# Patient Record
Sex: FEMALE | Race: WHITE | Employment: OTHER | URBAN - METROPOLITAN AREA
[De-identification: names, ages, dates, MRNs, and addresses within clinical notes are randomized per-mention and may not be internally consistent; named-entity substitution may affect disease eponyms.]

---

## 2020-11-12 ENCOUNTER — OFFICE VISIT (OUTPATIENT)
Dept: NEUROLOGY | Age: 70
End: 2020-11-12
Payer: MEDICARE

## 2020-11-12 VITALS
OXYGEN SATURATION: 98 % | RESPIRATION RATE: 18 BRPM | BODY MASS INDEX: 31.71 KG/M2 | HEART RATE: 72 BPM | HEIGHT: 67 IN | WEIGHT: 202 LBS | SYSTOLIC BLOOD PRESSURE: 118 MMHG | DIASTOLIC BLOOD PRESSURE: 72 MMHG | TEMPERATURE: 97.5 F

## 2020-11-12 DIAGNOSIS — F41.9 ANXIETY: ICD-10-CM

## 2020-11-12 DIAGNOSIS — R93.0 ABNORMAL MRI SCAN, HEAD: Primary | ICD-10-CM

## 2020-11-12 PROCEDURE — 1101F PT FALLS ASSESS-DOCD LE1/YR: CPT | Performed by: SPECIALIST

## 2020-11-12 PROCEDURE — 1090F PRES/ABSN URINE INCON ASSESS: CPT | Performed by: SPECIALIST

## 2020-11-12 PROCEDURE — G8400 PT W/DXA NO RESULTS DOC: HCPCS | Performed by: SPECIALIST

## 2020-11-12 PROCEDURE — G8417 CALC BMI ABV UP PARAM F/U: HCPCS | Performed by: SPECIALIST

## 2020-11-12 PROCEDURE — 99204 OFFICE O/P NEW MOD 45 MIN: CPT | Performed by: SPECIALIST

## 2020-11-12 PROCEDURE — G8536 NO DOC ELDER MAL SCRN: HCPCS | Performed by: SPECIALIST

## 2020-11-12 PROCEDURE — 3017F COLORECTAL CA SCREEN DOC REV: CPT | Performed by: SPECIALIST

## 2020-11-12 PROCEDURE — G8432 DEP SCR NOT DOC, RNG: HCPCS | Performed by: SPECIALIST

## 2020-11-12 PROCEDURE — G8427 DOCREV CUR MEDS BY ELIG CLIN: HCPCS | Performed by: SPECIALIST

## 2020-11-12 RX ORDER — CETIRIZINE HYDROCHLORIDE 10 MG/1
CAPSULE, LIQUID FILLED ORAL
COMMUNITY

## 2020-11-12 RX ORDER — MONTELUKAST SODIUM 10 MG/1
10 TABLET ORAL DAILY
COMMUNITY

## 2020-11-12 RX ORDER — DIAZEPAM 5 MG/1
TABLET ORAL
Qty: 2 TAB | Refills: 0 | Status: SHIPPED | OUTPATIENT
Start: 2020-11-12

## 2020-11-12 RX ORDER — DICYCLOMINE HYDROCHLORIDE 20 MG/1
20 TABLET ORAL EVERY 6 HOURS
COMMUNITY

## 2020-11-12 NOTE — PATIENT INSTRUCTIONS
Patient history reviewed patient examined. A very interesting capture  on brain MRI of a right brain lesion and I think it is more of an accidental pickup in the big scheme of things. Would like to suggest follow-up imaging of the head and also include the neck around the first or second week in December to see how the anatomy matures. Further suggestions obviously could follow but her exam did not betray any neurologic findings above and beyond the history supplied. Will prescription some Valium in case it is needed for the MRI experience and will need a . Been a pleasure working with her and the original CD imaging will be taken with her for the radiologist to compare in contrast with the updated imaging.

## 2020-11-12 NOTE — LETTER
11/12/20 Patient: Fern Garcia YOB: 1950 Date of Visit: 11/12/2020 [facility], [address] [address] VIA Facsimile: [phone] Dear [facility] MD, Thank you for referring Ms. Collins Smith to [facility] for evaluation. My notes for this consultation are attached. If you have questions, please do not hesitate to call me. I look forward to following your patient along with you.  
 
 
Sincerely, 
 
Eric Mtz MD

## 2020-12-15 ENCOUNTER — TELEPHONE (OUTPATIENT)
Dept: NEUROLOGY | Age: 70
End: 2020-12-15

## 2020-12-15 NOTE — TELEPHONE ENCOUNTER
----- Message from [place] Garcia sent at 12/15/2020  1:34 PM EST -----  Regarding: Dr. Felipa Palacio Message/Vendor Calls    Caller's first and last name:Kierra Conway [facility]      Reason for call:MRI appt      Callback required yes/no and why:yes      Best contact number(s):[phone]      Details to clarify the request:Pt stated she was advised by the doctor in November she would receive a call to schedule an MRI, but she have not received a call.       Florida Garcia

## 2020-12-15 NOTE — TELEPHONE ENCOUNTER
Call placed to inquire about MRI scheduling. RepVinita Poe reports that the patient was left 3 voice messages. Rep will call patient immediately to schedule.

## 2020-12-22 ENCOUNTER — HOSPITAL ENCOUNTER (OUTPATIENT)
Dept: MRI IMAGING | Age: 70
Discharge: HOME | End: 2020-12-22
Attending: SPECIALIST
Payer: MEDICARE

## 2020-12-22 VITALS — WEIGHT: 200 LBS | BODY MASS INDEX: 31.32 KG/M2

## 2020-12-22 DIAGNOSIS — R93.0 ABNORMAL MRI SCAN, HEAD: ICD-10-CM

## 2020-12-22 PROCEDURE — 70553 MRI BRAIN STEM W/O & W/DYE: CPT

## 2020-12-22 PROCEDURE — 74011250636 HC RX REV CODE- 250/636: Performed by: RADIOLOGY

## 2020-12-22 PROCEDURE — A9575 INJ GADOTERATE MEGLUMI 0.1ML: HCPCS | Performed by: RADIOLOGY

## 2020-12-22 PROCEDURE — 72156 MRI NECK SPINE W/O & W/DYE: CPT

## 2020-12-22 RX ORDER — GADOTERATE MEGLUMINE 376.9 MG/ML
18 INJECTION INTRAVENOUS
Status: COMPLETED | OUTPATIENT
Start: 2020-12-22 | End: 2020-12-22

## 2020-12-22 RX ADMIN — GADOTERATE MEGLUMINE 18 ML: 376.9 INJECTION INTRAVENOUS at 15:14

## 2020-12-23 ENCOUNTER — TELEPHONE (OUTPATIENT)
Dept: NEUROLOGY | Age: 70
End: 2020-12-23

## 2020-12-23 NOTE — TELEPHONE ENCOUNTER
----- Message from Turner Aguiar sent at 12/23/2020  2:28 PM EST -----  Regarding: STEPHEN/TELEPHONE  Contact: [phone]  General Message/Vendor Calls    Caller's first and last name: Krys Lauren      Reason for call: Previous MRI scan sent to [address] [place] [address] to compare      Callback required yes/no and why: Yes      Best contact number(s): [phone]      Details to clarify the request: Patient requesting to have the MRI scans from [place] [address] sent to [address] [place] [address] imagining to compare films.        Turner Aguiar

## 2021-01-04 ENCOUNTER — OFFICE VISIT (OUTPATIENT)
Dept: NEUROLOGY | Age: 71
End: 2021-01-04
Payer: MEDICARE

## 2021-01-04 VITALS
WEIGHT: 208.3 LBS | OXYGEN SATURATION: 96 % | HEART RATE: 70 BPM | DIASTOLIC BLOOD PRESSURE: 68 MMHG | HEIGHT: 67 IN | RESPIRATION RATE: 20 BRPM | BODY MASS INDEX: 32.69 KG/M2 | SYSTOLIC BLOOD PRESSURE: 126 MMHG

## 2021-01-04 DIAGNOSIS — R93.0 ABNORMAL MRI SCAN, HEAD: Primary | ICD-10-CM

## 2021-01-04 PROCEDURE — G8536 NO DOC ELDER MAL SCRN: HCPCS | Performed by: SPECIALIST

## 2021-01-04 PROCEDURE — G8427 DOCREV CUR MEDS BY ELIG CLIN: HCPCS | Performed by: SPECIALIST

## 2021-01-04 PROCEDURE — 3017F COLORECTAL CA SCREEN DOC REV: CPT | Performed by: SPECIALIST

## 2021-01-04 PROCEDURE — G8417 CALC BMI ABV UP PARAM F/U: HCPCS | Performed by: SPECIALIST

## 2021-01-04 PROCEDURE — 1090F PRES/ABSN URINE INCON ASSESS: CPT | Performed by: SPECIALIST

## 2021-01-04 PROCEDURE — 99213 OFFICE O/P EST LOW 20 MIN: CPT | Performed by: SPECIALIST

## 2021-01-04 PROCEDURE — G8510 SCR DEP NEG, NO PLAN REQD: HCPCS | Performed by: SPECIALIST

## 2021-01-04 PROCEDURE — G8400 PT W/DXA NO RESULTS DOC: HCPCS | Performed by: SPECIALIST

## 2021-01-04 PROCEDURE — 1101F PT FALLS ASSESS-DOCD LE1/YR: CPT | Performed by: SPECIALIST

## 2021-01-04 RX ORDER — CHOLECALCIFEROL (VITAMIN D3) 125 MCG
CAPSULE ORAL
COMMUNITY

## 2021-01-04 NOTE — PROGRESS NOTES
Neurology Consult      Subjective:      Tonya Stanford is a 78 y.o. female who comes in today from original revisit in early October. Had hearing difficulties which prompted ENT to do an MRI which in turn showed an abnormal right parietal white matter lesion with a 7 mm point of focus to enhancement. We will allow 2+ months to transpire before we repeated the MRI of the brain and we got a concomitant MRI of the C-spine result. She was asked to bring her original MRI as a point of comparison and she did either 1 or 2 days after the scan done at [facility]. We are waiting for the comparison as there was some question as to where the comparison MRI disc was? Will pend revisit accordingly. In terms of the hearing now she has no new symptoms. Thinks her hearing is okay and at this point politely declines hearing aids for the ears, and I guess she is to be seen as needed by ENT, until that time. Also has no new symptoms to discuss beyond the hearing issue. Current Outpatient Medications   Medication Sig Dispense Refill    cholecalciferol, vitamin D3, 50 mcg (2,000 unit) tab Take  by mouth.  GABAPENTIN PO Take  by mouth.  Cetirizine (ZyrTEC) 10 mg cap Take  by mouth.  fluticasone propionate (FLONASE NA) by Nasal route.  montelukast (Singulair) 10 mg tablet Take 10 mg by mouth daily.  traZODone (DESYREL) 100 mg tablet Take 200 mg by mouth nightly.  levothyroxine (SYNTHROID) 175 mcg tablet Take 175 mcg by mouth Daily (before breakfast). Name Brand necessary       DULoxetine (CYMBALTA) 60 mg capsule Take 60 mg by mouth daily.  multivitamin (MULTIPLE VITAMINS) tablet Take 1 Tab by mouth daily.  dicyclomine (BENTYL) 20 mg tablet Take 20 mg by mouth every six (6) hours.  diazePAM (Valium) 5 mg tablet Can take 1 or 2 prior to scan if needed. .. Needs a .   Indications: anxious 2 Tab 0    naproxen (NAPROSYN) 500 mg tablet Take 500 mg by mouth two (2) times daily (with meals).  omeprazole (PRILOSEC) 20 mg capsule Take 20 mg by mouth as needed.  diclofenac EC (VOLTAREN) 75 mg EC tablet Take 75 mg by mouth daily. Allergies   Allergen Reactions    Prednisone Not Reported This Time     Past Medical History:   Diagnosis Date    Anxiety disorder     Ectopic beats     Headache     Hearing reduced     Palpitations     Paroxysmal supraventricular tachycardia (HCC)     Pericardial effusion     Systemic lupus erythematosus (HCC)       No past surgical history on file. Social History     Socioeconomic History    Marital status:      Spouse name: Not on file    Number of children: Not on file    Years of education: Not on file    Highest education level: Not on file   Occupational History    Not on file   Social Needs    Financial resource strain: Not on file    Food insecurity     Worry: Not on file     Inability: Not on file    Transportation needs     Medical: Not on file     Non-medical: Not on file   Tobacco Use    Smoking status: Never Smoker    Smokeless tobacco: Never Used   Substance and Sexual Activity    Alcohol use: Not Currently    Drug use: Not on file    Sexual activity: Not on file   Lifestyle    Physical activity     Days per week: Not on file     Minutes per session: Not on file    Stress: Not on file   Relationships    Social connections     Talks on phone: Not on file     Gets together: Not on file     Attends Protestant service: Not on file     Active member of club or organization: Not on file     Attends meetings of clubs or organizations: Not on file     Relationship status: Not on file    Intimate partner violence     Fear of current or ex partner: Not on file     Emotionally abused: Not on file     Physically abused: Not on file     Forced sexual activity: Not on file   Other Topics Concern    Not on file   Social History Narrative    Not on file      No family history on file. Visit Vitals  /68   Pulse 70   Resp 20   Ht 5' 7\" (1.702 m)   Wt 94.5 kg (208 lb 4.8 oz)   SpO2 96%   BMI 32.62 kg/m²        Review of Systems:   A comprehensive review of systems was negative except for that written in the HPI. Neuro Exam:     Appearance: The patient is well developed, well nourished, provides a coherent history and is in no acute distress. Mental Status: Oriented to time, place and person. Mood and affect appropriate. Cranial Nerves:   Intact visual fields. Fundi are benign. VERNON, EOM's full, no nystagmus, no ptosis. Facial sensation is normal. Corneal reflexes are intact. Facial movement is symmetric. Hearing is normal bilaterally. Palate is midline with normal sternocleidomastoid and trapezius muscles are normal. Tongue is midline. Motor:  5/5 strength in upper and lower proximal and distal muscles. Normal bulk and tone. No fasciculations. Reflexes:   Deep tendon reflexes 2+/4 and symmetrical.   Sensory:   Normal to touch, pinprick and vibration. Gait:  Normal gait. Tremor:   No tremor noted. Cerebellar:  No cerebellar signs present. Neurovascular:  Normal heart sounds and regular rhythm, peripheral pulses intact, and no carotid bruits. Assessment:   Abnormal MRI scan. We are awaiting word from [facility] radiology as to the points of comparison between her original brain MRI with the right parietal focus and minor enhancement compared to updated brain MRI and affiliated MRI of the C-spine. The neck step is critical in terms of where we go as it could be spinal fluid analysis blood results to better refine their diagnostic possibilities. Fortunately he does not have any new symptoms or concerns at this time. Plan:   Revisit pended.   Signed by :  Hadley Ortega MD

## 2021-01-04 NOTE — PATIENT INSTRUCTIONS
At this point we are waiting for the official comparative updated MRI of the brain and affiliated C-spines to the original MRI done in early October. Fortunately has added no new symptoms and feels her hearing has settled although she says she is not ready for hearing aids in either ear. Will pend her revisit as this additional information is critical to no air next steps.

## 2021-01-04 NOTE — LETTER
1/6/2021 Patient: Osito Lawrence YOB: 1950 Date of Visit: 1/4/2021 Lex [facility]unez, [address] [address] Via Fax: [phone] Dear Lex Nunez MD, Thank you for referring Ms. Margi Jarvis to [facility] for evaluation. My notes for this consultation are attached. If you have questions, please do not hesitate to call me. I look forward to following your patient along with you.  
 
 
Sincerely, 
 
Amy Hammonds MD

## 2021-01-08 NOTE — PROGRESS NOTES
Radiology will be performing additional imaging at no costs to patient to clarify her results.  johnny

## 2023-05-18 ENCOUNTER — TRANSCRIBE ORDERS (OUTPATIENT)
Facility: HOSPITAL | Age: 73
End: 2023-05-18

## 2023-05-18 DIAGNOSIS — M51.36 DDD (DEGENERATIVE DISC DISEASE), LUMBAR: Primary | ICD-10-CM

## 2023-05-18 DIAGNOSIS — M47.816 LUMBAR SPONDYLOSIS: ICD-10-CM

## 2023-05-18 DIAGNOSIS — M54.16 LUMBAR RADICULITIS: ICD-10-CM

## 2023-05-31 ENCOUNTER — HOSPITAL ENCOUNTER (OUTPATIENT)
Facility: HOSPITAL | Age: 73
Discharge: HOME | End: 2023-06-03
Payer: MEDICARE

## 2023-05-31 VITALS — WEIGHT: 215 LBS

## 2023-05-31 DIAGNOSIS — M54.16 LUMBAR RADICULITIS: ICD-10-CM

## 2023-05-31 DIAGNOSIS — M47.816 LUMBAR SPONDYLOSIS: ICD-10-CM

## 2023-05-31 DIAGNOSIS — M51.36 DDD (DEGENERATIVE DISC DISEASE), LUMBAR: ICD-10-CM

## 2023-05-31 PROCEDURE — 72148 MRI LUMBAR SPINE W/O DYE: CPT

## 2025-02-19 NOTE — PROGRESS NOTES
Patient had follow-up imaging of the brain MRI at [place] [address]. With the additional images it pointed to suggestions of a small clustering of blood vessels in the right brain and not MS or categorically any neoplasm. 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 For some reason I did not get the updated version of the additional images, but was able to see the report on my nurses screen, which did have the benefit of the latest interpretation with additional imaging. The patient was called and we confirmed the benign nature of the images and she was reassured. Suggested that she might want to write down this incident and where her ultimate imaging was obtained at [place] [address], in case this topic comes up again, in discussion in the ER or similar medical environment. johnny 27.1